# Patient Record
Sex: FEMALE | Race: BLACK OR AFRICAN AMERICAN | NOT HISPANIC OR LATINO | URBAN - METROPOLITAN AREA
[De-identification: names, ages, dates, MRNs, and addresses within clinical notes are randomized per-mention and may not be internally consistent; named-entity substitution may affect disease eponyms.]

---

## 2019-12-27 ENCOUNTER — HOSPITAL ENCOUNTER (EMERGENCY)
Facility: HOSPITAL | Age: 44
Discharge: LEFT BEFORE DISCHARGE | End: 2019-12-27
Attending: EMERGENCY MEDICINE
Payer: MEDICAID

## 2019-12-27 VITALS
HEART RATE: 87 BPM | RESPIRATION RATE: 18 BRPM | HEIGHT: 62 IN | SYSTOLIC BLOOD PRESSURE: 152 MMHG | DIASTOLIC BLOOD PRESSURE: 90 MMHG | TEMPERATURE: 98 F | WEIGHT: 260 LBS | BODY MASS INDEX: 47.84 KG/M2 | OXYGEN SATURATION: 99 %

## 2019-12-27 DIAGNOSIS — E11.65 UNCONTROLLED TYPE 2 DIABETES MELLITUS WITH HYPERGLYCEMIA: ICD-10-CM

## 2019-12-27 DIAGNOSIS — J06.9 VIRAL URI WITH COUGH: Primary | ICD-10-CM

## 2019-12-27 DIAGNOSIS — I10 UNCONTROLLED HYPERTENSION: ICD-10-CM

## 2019-12-27 DIAGNOSIS — R05.9 COUGH: ICD-10-CM

## 2019-12-27 LAB
CTP QC/QA: YES
POC MOLECULAR INFLUENZA A AGN: NEGATIVE
POC MOLECULAR INFLUENZA B AGN: NEGATIVE
POCT GLUCOSE: 316 MG/DL (ref 70–110)

## 2019-12-27 PROCEDURE — 94761 N-INVAS EAR/PLS OXIMETRY MLT: CPT

## 2019-12-27 PROCEDURE — 82962 GLUCOSE BLOOD TEST: CPT

## 2019-12-27 PROCEDURE — 99283 EMERGENCY DEPT VISIT LOW MDM: CPT | Mod: 25

## 2019-12-27 PROCEDURE — 25000242 PHARM REV CODE 250 ALT 637 W/ HCPCS: Performed by: EMERGENCY MEDICINE

## 2019-12-27 PROCEDURE — 87502 INFLUENZA DNA AMP PROBE: CPT

## 2019-12-27 PROCEDURE — 94640 AIRWAY INHALATION TREATMENT: CPT

## 2019-12-27 RX ORDER — IPRATROPIUM BROMIDE AND ALBUTEROL SULFATE 2.5; .5 MG/3ML; MG/3ML
3 SOLUTION RESPIRATORY (INHALATION)
Status: COMPLETED | OUTPATIENT
Start: 2019-12-27 | End: 2019-12-27

## 2019-12-27 RX ORDER — HYDRALAZINE HYDROCHLORIDE 20 MG/ML
10 INJECTION INTRAMUSCULAR; INTRAVENOUS
Status: DISCONTINUED | OUTPATIENT
Start: 2019-12-27 | End: 2019-12-27 | Stop reason: HOSPADM

## 2019-12-27 RX ADMIN — IPRATROPIUM BROMIDE AND ALBUTEROL SULFATE 3 ML: .5; 3 SOLUTION RESPIRATORY (INHALATION) at 11:12

## 2019-12-27 NOTE — ED NOTES
"Upon entering pts room, pt removing all monitor cables. Refusing BP to be taken. Pt states, "I feel better I am ready to go. Get this thing out of me now please." MD aware. Iv removed. Pt refusing all vitals. Pt yelling at grandchild in room stating "I am leaving."   "

## 2019-12-27 NOTE — ED NOTES
"While waiting for paperwork pt states, "I am not waiting in here I will be in the lobby waiting. I need to get us a drink." Pt informed of need to stay in room incase doctor needs to talk to her again. Pt states, "I will be in the lobby." MD aware.   "

## 2019-12-27 NOTE — ED PROVIDER NOTES
Encounter Date: 12/27/2019    SCRIBE #1 NOTE: I, Curt Duque, am scribing for, and in the presence of,  Luis A Mathew MD. I have scribed the following portions of the note - Other sections scribed: HPI, ROS.       History     Chief Complaint   Patient presents with    Shortness of Breath     SOB and productive cough x 2 days, per EMS pt was sent by urgent care because they dont accept her insurance      This is a 44 y.o. female with a PMHx of Diabetes Mellitus, Hypertension, and Bronchitis who presents to the Emergency Department with a cc of worsening shortness of breath that has been ongoing for two days.  Pt reports associated productive cough, wheezing, rhinorrhea, generalized weakness, and chills.  She denies chest pain, sore throat, fever, eye pain, abdominal pain, diarrhea, hemoptysis, dysuria, leg swelling, and arm/leg complications.  Pt states that she used her inhaler this morning, but it did not alleviate her symptoms.  Pt denies any pertinent PSHx.  Pt notes that she consumes alcohol and tobacco.  She denies the use of recreational drugs.  NKDA.      The history is provided by the patient.     Review of patient's allergies indicates:  No Known Allergies  No past medical history on file.  History reviewed. No pertinent surgical history.  Family History   Problem Relation Age of Onset    Diabetes Maternal Aunt     Depression Maternal Aunt      Social History     Tobacco Use    Smoking status: Not on file   Substance Use Topics    Alcohol use: Not on file    Drug use: Not on file     Review of Systems   Constitutional: Positive for chills. Negative for diaphoresis and fever.   HENT: Positive for rhinorrhea. Negative for ear pain and sore throat.    Eyes: Negative for pain.   Respiratory: Positive for cough, shortness of breath and wheezing.         (-) hemoptysis    Cardiovascular: Negative for chest pain and leg swelling.   Gastrointestinal: Negative for abdominal pain, diarrhea, nausea and  vomiting.   Genitourinary: Negative for dysuria.   Musculoskeletal: Negative for myalgias.   Skin: Negative for rash.   Neurological: Positive for weakness. Negative for headaches.       Physical Exam     Initial Vitals [12/27/19 1034]   BP Pulse Resp Temp SpO2   (!) 185/122 90 (!) 24 98.1 °F (36.7 °C) 100 %      MAP       --         Physical Exam  The patient was examined specifically for the following:   General:No significant distress, Good color, Warm and dry. Head and neck:Scalp atraumatic, Neck supple. Neurological:Appropriate conversation, Gross motor deficits. Eyes:Conjugate gaze, Clear corneas. ENT: No epistaxis. Cardiac: Regular rate and rhythm, Grossly normal heart tones. Pulmonary: Wheezing, Rales. Gastrointestinal: Abdominal tenderness, Abdominal distention. Musculoskeletal: Extremity deformity, Apparent pain with range of motion of the joints. Skin: Rash.   The findings on examination were normal except for the following:  Patient's blood pressure is 185/122.  Respiratory rate is 24.  Oxygen saturations are 100%.  The heart rate is 90.  The lungs are clear.  The patient is coughing in the physical examination.  ED Course   Procedures  Labs Reviewed   POCT GLUCOSE - Abnormal; Notable for the following components:       Result Value    POCT Glucose 316 (*)     All other components within normal limits   POCT INFLUENZA A/B MOLECULAR   POCT GLUCOSE MONITORING CONTINUOUS          Imaging Results    None       Medical decision making:  This patient presented to the emergency with uncontrolled hypertension and hyperglycemia.  She also had cough and shortness of breath.  The patient was treated felt better and did not wish to stay for the rest of her evaluation.  Her blood pressure was controlled.  Her last blood sugar was 230.  She is no longer short of breath a discharge. She does not wish to stay to see me.  She wishes to sign out against medical advice.                 Scribe Attestation:   Scribe #1: I  performed the above scribed service and the documentation accurately describes the services I performed. I attest to the accuracy of the note.                          Clinical Impression:       ICD-10-CM ICD-9-CM   1. Viral URI with cough J06.9 465.9    B97.89    2. Cough R05 786.2   3. Uncontrolled hypertension I10 401.9   4. Uncontrolled type 2 diabetes mellitus with hyperglycemia E11.65 250.02            I personally performed the services described in this documentation.  All medical record  entries made by the scribe are at my direction and in my presence.  Signed, Dr. Quincy Mathew MD  12/27/19 1216